# Patient Record
Sex: MALE
[De-identification: names, ages, dates, MRNs, and addresses within clinical notes are randomized per-mention and may not be internally consistent; named-entity substitution may affect disease eponyms.]

---

## 2023-12-13 PROBLEM — Z00.00 ENCOUNTER FOR PREVENTIVE HEALTH EXAMINATION: Status: ACTIVE | Noted: 2023-12-13

## 2023-12-14 ENCOUNTER — APPOINTMENT (OUTPATIENT)
Dept: SURGERY | Facility: CLINIC | Age: 28
End: 2023-12-14
Payer: COMMERCIAL

## 2023-12-14 VITALS
DIASTOLIC BLOOD PRESSURE: 68 MMHG | HEART RATE: 116 BPM | WEIGHT: 160 LBS | SYSTOLIC BLOOD PRESSURE: 113 MMHG | HEIGHT: 69 IN | BODY MASS INDEX: 23.7 KG/M2

## 2023-12-14 DIAGNOSIS — Z78.9 OTHER SPECIFIED HEALTH STATUS: ICD-10-CM

## 2023-12-14 PROCEDURE — 99203 OFFICE O/P NEW LOW 30 MIN: CPT

## 2023-12-14 NOTE — PLAN
[FreeTextEntry1] : patient has used topical rectal cream x 2 days, which he was advised to continue  recommended sitz bath w warm water  RTO next week, 12/21   Patient's questions and concerns addressed to their satisfaction, and patient verbalized an understanding of the information discussed.

## 2023-12-14 NOTE — PHYSICAL EXAM
[Normal] : was normal [None] : there was no rectal mass  [No Rash or Lesion] : No rash or lesion [Alert] : alert [Oriented to Person] : oriented to person [Oriented to Place] : oriented to place [Oriented to Time] : oriented to time [Calm] : calm [Excoriation] : no perianal excoriation [Fistula] : no fistulas [Pilonidal Cyst] : no pilonidal cysts [External Hemorrhoid] : no external hemorrhoids were present [Thrombosed] : that was not thrombosed [Skin Tags] : there were no residual hemorrhoidal skin tags seen [de-identified] : A/Ox3; NAD. appears comfortable [de-identified] : EOMI; sclera anicteric. [de-identified] : no cervical lymphadenopathy  [de-identified] : airway patent, no use of accessory muscles [de-identified] : Abd is soft, nondistended, no rebound or guarding. No abdominal masses. No abdominal tenderness. [de-identified] : has a lot of pain with BESSY, no fissures seen. no drainage or evidence of infection, no thrombosed hemorrhoids seen

## 2023-12-14 NOTE — REVIEW OF SYSTEMS
[Negative] : Heme/Lymph [Abdominal Pain] : no abdominal pain [Constipation] : no constipation [FreeTextEntry7] : rectal pain x 2-3 days

## 2023-12-14 NOTE — HISTORY OF PRESENT ILLNESS
[de-identified] : PRITESH SYKES is a 28 year old M who is referred to the office for consultation visit, he presents w the cc of having rectal pain over the past 2-3 days and hemorrhoids. He denies bleeding. Applies topical rectal cream w minimal relief. Patient states he has a lot of difficulty sitting down and states pain is severe. Denies straining or constipation. No abdominal pain.

## 2023-12-14 NOTE — ASSESSMENT
[FreeTextEntry1] : IMP: 29 yo M w cc of having rectal pain and hemorrhoids. No bleeding.  Unable to perform full rectal exam /sigmoidoscopy as patient is having too much pain.

## 2023-12-14 NOTE — CONSULT LETTER
[Dear  ___] : Dear  [unfilled], [Consult Letter:] : I had the pleasure of evaluating your patient, [unfilled]. [Consult Closing:] : Thank you very much for allowing me to participate in the care of this patient.  If you have any questions, please do not hesitate to contact me. [Sincerely,] : Sincerely, [FreeTextEntry3] : Michael Manriquez MD

## 2023-12-21 ENCOUNTER — APPOINTMENT (OUTPATIENT)
Dept: SURGERY | Facility: CLINIC | Age: 28
End: 2023-12-21
Payer: COMMERCIAL

## 2023-12-21 DIAGNOSIS — K62.89 OTHER SPECIFIED DISEASES OF ANUS AND RECTUM: ICD-10-CM

## 2023-12-21 DIAGNOSIS — K64.9 UNSPECIFIED HEMORRHOIDS: ICD-10-CM

## 2023-12-21 PROCEDURE — 46600 DIAGNOSTIC ANOSCOPY SPX: CPT

## 2023-12-21 PROCEDURE — 99213 OFFICE O/P EST LOW 20 MIN: CPT | Mod: 25

## 2023-12-21 NOTE — HISTORY OF PRESENT ILLNESS
[de-identified] : PRITESH SYKES is a 28 year old M here for a follow up visit, w the cc of having rectal pain > 1 week ago.  Today, patient states his symptoms have improved, overall. He had been using topical rectal cream, which he now discontinued. He denies having rectal pain. No bleeding. BM' are normal.

## 2023-12-21 NOTE — PLAN
[FreeTextEntry1] : pain likely a/w anal fissure, which is improving  results of physical exam findings discussed w the patient  conservative management recommended; apply topical rectal cream prn  avoid constipation/straining, stool softeners prn  sitz baths  RTO as needed   Patient's questions and concerns addressed to their satisfaction, and patient verbalized an understanding of the information discussed.

## 2023-12-21 NOTE — PHYSICAL EXAM
[Normal] : was normal [None] : there was no rectal mass  [No Rash or Lesion] : No rash or lesion [Alert] : alert [Oriented to Person] : oriented to person [Oriented to Place] : oriented to place [Oriented to Time] : oriented to time [Calm] : calm [de-identified] : A/Ox3; NAD. appears comfortable [de-identified] : EOMI; sclera anicteric. [de-identified] : no cervical lymphadenopathy  [de-identified] : airway patent, no use of accessory muscles [de-identified] : Abd is soft, nondistended, no rebound or guarding. No abdominal masses. No abdominal tenderness. [de-identified] : minimal tenderness

## 2024-07-29 ENCOUNTER — TRANSCRIPTION ENCOUNTER (OUTPATIENT)
Age: 29
End: 2024-07-29

## 2024-07-30 ENCOUNTER — TRANSCRIPTION ENCOUNTER (OUTPATIENT)
Age: 29
End: 2024-07-30